# Patient Record
Sex: FEMALE | Race: WHITE | URBAN - METROPOLITAN AREA
[De-identification: names, ages, dates, MRNs, and addresses within clinical notes are randomized per-mention and may not be internally consistent; named-entity substitution may affect disease eponyms.]

---

## 2024-06-17 ENCOUNTER — OFFICE VISIT (OUTPATIENT)
Dept: OTOLARYNGOLOGY | Facility: CLINIC | Age: 44
End: 2024-06-17

## 2024-06-17 DIAGNOSIS — J32.0 CHRONIC MAXILLARY SINUSITIS: Primary | ICD-10-CM

## 2024-06-17 PROCEDURE — 31231 NASAL ENDOSCOPY DX: CPT | Performed by: OTOLARYNGOLOGY

## 2024-06-17 PROCEDURE — 99203 OFFICE O/P NEW LOW 30 MIN: CPT | Performed by: OTOLARYNGOLOGY

## 2024-06-17 RX ORDER — ALBUTEROL SULFATE 90 UG/1
2 AEROSOL, METERED RESPIRATORY (INHALATION) EVERY 6 HOURS PRN
COMMUNITY
Start: 2024-01-07

## 2024-06-17 RX ORDER — LEVOTHYROXINE SODIUM 0.07 MG/1
75 TABLET ORAL DAILY
COMMUNITY
Start: 2023-04-14

## 2024-06-17 NOTE — PROGRESS NOTES
NEW PATIENT PROGRESS NOTE  OTOLOGY/OTOLARYNGOLOGY    REF MD:  No referring provider defined for this encounter.    PCP: Yumiko Page MD    CHIEF COMPLAINT:    Chief Complaint   Patient presents with    Sinus Problem     Patient reports sinus pressure, reports sinus infections  x 2 in the past month.         HISTORY OF PRESENT ILLNESS: Kandace Juares is a 43 year old female who presents for evaluation of right-sided ear and sinus pressure with pain that radiates to jaw. Patient reports that 2 months she saw her dentist and had a normal exam. She saw PCP on 4/05/2024 for 1 week of sinus pain and right otalgia, diagnosed with acute maxillary sinusitis and started on a 10 days course of Augmentin. Patient has been on multiple rounds of antibiotics and steroids in the past for similar problem. The patient was diagnosed with a dust mite allergy after seeing an allergist in [place]. She uses nasal saline spray and a neti pot. The patient states that eleven days ago had her first migraine episode. The patient states that she had spinning vertigo when she was younger about fifteen years ago, however this has since resolved. Denies having had a previous ear tube placed or any prior otologic procedures.       PAST MEDICAL HISTORY:    Past Medical History:    Hyperthyroidism       PAST SURGICAL HISTORY:  History reviewed. No pertinent surgical history.    Current Outpatient Medications on File Prior to Visit   Medication Sig Dispense Refill    albuterol 108 (90 Base) MCG/ACT Inhalation Aero Soln Inhale 2 puffs into the lungs every 6 (six) hours as needed.      levothyroxine 75 MCG Oral Tab Take 1 tablet (75 mcg total) by mouth daily.       No current facility-administered medications on file prior to visit.       Allergies: Not on File    SOCIAL HISTORY:    Social History     Tobacco Use    Smoking status: Never    Smokeless tobacco: Never   Substance Use Topics    Alcohol use: Not Currently       FAMILY HISTORY:  Denies known family history of hearing loss, tinnitus, vertigo, or migraine.  Denies known family history of head and neck cancer, thyroid cancer, bleeding disorders.     REVIEW OF SYSTEMS:   Positives are in bold  Neuro: Headache, facial weakness, facial numbness, neck pain, vertigo  ENT: Hearing change, tinnitus, otorrhea, otalgia, aural fullness, ear pressure, vertigo, imbalance  Sinus pressure, rhinorrhea, congestion, facial pain, jaw pain, dysphagia, odynophagia, sore throat, voice changes, shortness of breath    EXAMINATION:  I washed my hands with an alcohol-based hand gel prior to examination  Constitutional:   --Vitals: There were no vitals taken for this visit.  General: no apparent distress, well-developed, conversant  Psych: affect pleasant and appropriate for age, alert and oriented  Respiratory: No stridor, stertor or increased work of breathing  ENT:  --Nose: no external nasal deformity, anterior rhinoscopy: Septum midline, no inferior turbinate hypertrophy, mucosa healthy, no rhinorrhea  --OC/OP: No trismus. No masses or lesions noted over the gingiva, buccal mucosa, tongue, FOM, hard/soft palate, tonsillar pillars, posterior pharyngeal wall. Tonsils are 1+ and soft. FOM/BOT are soft.   --Neck: No palpable cervical lymphadenopathy, no thyromegaly, no masses or lesions over the bilateral submandibular or parotid glands  --Ear: (bilateral ears were examined under binocular microscopy)  Right ear microscopic exam:  Pinna: Normal, no lesions or masses.  Mastoid: Nontender on palpation.   External auditory canal: Clear, no masses or lesions.  Tympanic membrane: Intact, no lesions, normal landmarks.  Middle ear: Aerated.    Left ear microscopic exam:  Pinna: Normal, no lesions or masses.  Mastoid: Nontender on palpation.   External auditory canal: Clear, no masses or lesions.  Tympanic membrane: Intact, no lesions, normal landmarks.  Middle ear: Aerated.    Nasal endoscopy (date 6/17/2024)  Verbal consent  obtained, patient was correctly identified  Topical anesthesia with aerosolized lidocaine and neosynepherine spray in the bilateral nares  Findings:   Right: No mucous throughout. Normal mucosa. Inferior turbinate is non-hypertrophic. Middle meatus is without polyps, purulence, masses. Middle turbinate is well formed and normal appearing. Sphenoethmoid recess is without polyps, purulence, masses.   Left: No mucous throughout. Normal mucosa. Inferior turbinate is non-hypertrophic. Middle meatus is without polyps, purulence, masses. Middle turbinate is well formed and normal appearing. Sphenoethmoid recess is without polyps, purulence, masses.   Septum: largely midline  Nasopharynx: No masses, bilateral eustachian tubes are patent. The bilateral fossae of Rosenmueller are clear.     ASSESSMENT/PLAN:  Kandace Juares is a 43 year old female with     ICD-10-CM   1. Chronic maxillary sinusitis  J32.0        IMPRESSION:  Chronic maxillary sinusitis    PLAN:  -CT Sinus w/o contrast recommended to further evaluate  -Lifestyle changes including stress reduction, migraine diet (caffeine cessation), sleep hygiene, hydration, regular meals discussed  -Patient education: Migraine: More than a Headache; this packet includes information regarding medication side effects  -Symptom diary  -Follow-up after imaging     Situation reviewed with the patient in detail.    Attention: This note has been scribed by Kely Serna under the supervision of Daljit Mcneal MD.     Daljit Mcneal MD  Otology/Otolaryngology  [address] [place] [address]  Phone [phone]  Fax [phone]      I have personally performed the services described in this documentation. All medical record entries made by the scribe were at my direction and in my presence. I have reviewed the chart and agree that the medical record reflects my personal performance and is accurate and  complete.

## 2024-06-28 ENCOUNTER — HOSPITAL ENCOUNTER (OUTPATIENT)
Dept: CT IMAGING | Facility: HOSPITAL | Age: 44
Discharge: HOME | End: 2024-06-28
Attending: OTOLARYNGOLOGY
Payer: COMMERCIAL

## 2024-06-28 DIAGNOSIS — J32.0 CHRONIC MAXILLARY SINUSITIS: ICD-10-CM

## 2024-06-28 PROCEDURE — 70486 CT MAXILLOFACIAL W/O DYE: CPT | Performed by: OTOLARYNGOLOGY

## 2024-07-02 NOTE — PROGRESS NOTES
PATIENT PROGRESS NOTE  OTOLOGY/OTOLARYNGOLOGY    REF MD:  No referring provider defined for this encounter.    PCP: Yumiko Page MD    CHIEF COMPLAINT:    Chief Complaint   Patient presents with    Results     Patient is here to discuss ct scan results.     LAST VISIT 6/17/2024  IMPRESSION:  Chronic maxillary sinusitis    PLAN:  -CT Sinus w/o contrast recommended to further evaluate  -Lifestyle changes including stress reduction, migraine diet (caffeine cessation), sleep hygiene, hydration, regular meals discussed  -Patient education: Migraine: More than a Headache; this packet includes information regarding medication side effects  -Symptom diary  -Follow-up after imaging   __________________________________________________________________________________  Interval history: Had CT since last visit. Feeling better overall. Notes she is severely allergic to dust and notes increased symptoms when exposed to dust.     HISTORY OF PRESENT ILLNESS: Kandace Juares is a 43 year old female who presents for evaluation of right-sided ear and sinus pressure with pain that radiates to jaw. Patient reports that 2 months she saw her dentist and had a normal exam. She saw PCP on 4/05/2024 for 1 week of sinus pain and right otalgia, diagnosed with acute maxillary sinusitis and started on a 10 days course of Augmentin. Patient has been on multiple rounds of antibiotics and steroids in the past for similar problem. The patient was diagnosed with a dust mite allergy after seeing an allergist in [place]. She uses nasal saline spray and a neti pot. The patient states that eleven days ago had her first migraine episode. The patient states that she had spinning vertigo when she was younger about fifteen years ago, however this has since resolved. Denies having had a previous ear tube placed or any prior otologic procedures.       PAST MEDICAL HISTORY:    Past Medical History:    Hyperthyroidism       PAST SURGICAL HISTORY:   History reviewed. No pertinent surgical history.    Current Outpatient Medications on File Prior to Visit   Medication Sig Dispense Refill    albuterol 108 (90 Base) MCG/ACT Inhalation Aero Soln Inhale 2 puffs into the lungs every 6 (six) hours as needed.      levothyroxine 75 MCG Oral Tab Take 1 tablet (75 mcg total) by mouth daily.       No current facility-administered medications on file prior to visit.       Allergies: Not on File    SOCIAL HISTORY:    Social History     Tobacco Use    Smoking status: Never    Smokeless tobacco: Never   Substance Use Topics    Alcohol use: Not Currently       FAMILY HISTORY: Denies known family history of hearing loss, tinnitus, vertigo, or migraine.  Denies known family history of head and neck cancer, thyroid cancer, bleeding disorders.     REVIEW OF SYSTEMS:   Positives are in bold  Neuro: Headache, facial weakness, facial numbness, neck pain, vertigo  ENT: Hearing change, tinnitus, otorrhea, otalgia, aural fullness, ear pressure, vertigo, imbalance  Sinus pressure, rhinorrhea, congestion, facial pain, jaw pain, dysphagia, odynophagia, sore throat, voice changes, shortness of breath    EXAMINATION:  I washed my hands with an alcohol-based hand gel prior to examination  Constitutional:   --Vitals: There were no vitals taken for this visit.  General: no apparent distress, well-developed, conversant  Psych: affect pleasant and appropriate for age, alert and oriented  Respiratory: No stridor, stertor or increased work of breathing  ENT:  --Nose: no external nasal deformity, anterior rhinoscopy: Septum midline, no inferior turbinate hypertrophy, mucosa healthy, no rhinorrhea  --OC/OP: No trismus. No masses or lesions noted over the gingiva, buccal mucosa, tongue, FOM, hard/soft palate, tonsillar pillars, posterior pharyngeal wall. Tonsils are 1+ and soft. FOM/BOT are soft.   --Neck: No palpable cervical lymphadenopathy, no thyromegaly, no masses or lesions over the bilateral  submandibular or parotid glands  --Ear: (bilateral ears were examined under binocular microscopy)  Right ear microscopic exam:  Pinna: Normal, no lesions or masses.  Mastoid: Nontender on palpation.   External auditory canal: Clear, no masses or lesions.  Tympanic membrane: Intact, no lesions, normal landmarks.  Middle ear: Aerated.    Left ear microscopic exam:  Pinna: Normal, no lesions or masses.  Mastoid: Nontender on palpation.   External auditory canal: Clear, no masses or lesions.  Tympanic membrane: Intact, no lesions, normal landmarks.  Middle ear: Aerated.    Nasal endoscopy (date 6/17/2024)  Verbal consent obtained, patient was correctly identified  Topical anesthesia with aerosolized lidocaine and neosynepherine spray in the bilateral nares  Findings:   Right: No mucous throughout. Normal mucosa. Inferior turbinate is non-hypertrophic. Middle meatus is without polyps, purulence, masses. Middle turbinate is well formed and normal appearing. Sphenoethmoid recess is without polyps, purulence, masses.   Left: No mucous throughout. Normal mucosa. Inferior turbinate is non-hypertrophic. Middle meatus is without polyps, purulence, masses. Middle turbinate is well formed and normal appearing. Sphenoethmoid recess is without polyps, purulence, masses.   Septum: largely midline  Nasopharynx: No masses, bilateral eustachian tubes are patent. The bilateral fossae of Rosenmueller are clear.     CT SINUS - 6/28/2024  Personally reviewed. Personal conclusions: Largely normal scan without sinus disease.  Impression  CONCLUSION:    Minimal mucoperiosteal thickening of the paranasal sinuses.   There is a questionable partially empty sella turcica.  Follow-up with MRI pituitary protocol with contrast for further evaluation may be done.    ASSESSMENT/PLAN:  Kandace Juares is a 43 year old female with     ICD-10-CM   1. Allergic rhinitis due to dust  J30.89   2. Empty sella turcica (HCC)  E23.6           IMPRESSION:  Allergic rhinitis, dust  Incidental finding of partially empty sella     PLAN:  -CT Sinus w/o contrast reviewed - no sinus disease  -Allergy referral   -Dicussed incidental finding of empty sella. Patient can consider neurology referral if desired; however discussed that this is a common incidental finding. Patient does not have frequent headaches, vision change, or pulsatile tinnitus.   -Lifestyle changes including stress reduction, migraine diet (caffeine cessation), sleep hygiene, hydration, regular meals discussed  -Follow-up as needed    Situation reviewed with the patient in detail.    Attention: This note has been scribed by Kely Serna under the supervision of Daljit Mcneal MD.     Daljit Mcneal MD  Otology/Otolaryngology  [address] [place] [address]  Phone [phone]  Fax [phone]      I have personally performed the services described in this documentation. All medical record entries made by the scribe were at my direction and in my presence. I have reviewed the chart and agree that the medical record reflects my personal performance and is accurate and complete.

## 2024-07-03 ENCOUNTER — OFFICE VISIT (OUTPATIENT)
Dept: OTOLARYNGOLOGY | Facility: CLINIC | Age: 44
End: 2024-07-03
Payer: COMMERCIAL

## 2024-07-03 DIAGNOSIS — E23.6 EMPTY SELLA TURCICA (HCC): ICD-10-CM

## 2024-07-03 DIAGNOSIS — J30.89 ALLERGIC RHINITIS DUE TO DUST: Primary | ICD-10-CM

## 2024-07-03 PROCEDURE — 99214 OFFICE O/P EST MOD 30 MIN: CPT | Performed by: OTOLARYNGOLOGY

## 2024-10-29 ENCOUNTER — TELEPHONE (OUTPATIENT)
Dept: ALLERGY | Facility: CLINIC | Age: 44
End: 2024-10-29

## 2024-10-29 NOTE — TELEPHONE ENCOUNTER
Patient called stating she has an appointment scheduled with  as a consult on 10/31/2024. She would like to verify if she needs to stop taking one of her antihistamines she is requesting.

## 2024-10-29 NOTE — TELEPHONE ENCOUNTER
Left message for patient that she will need to discontinue antihistamines five days prior to appointment. Asked patient to call back if she has any additional questions.     Antihistamines include: xyzal, zyrtec, benadryl, allegra, claritin and their generic forms. Azelastine nasal spray is also an antihistamine. PM formulations of medications typically contain benadryl.

## 2025-02-04 ENCOUNTER — NURSE ONLY (OUTPATIENT)
Dept: ALLERGY | Facility: CLINIC | Age: 45
End: 2025-02-04

## 2025-02-04 ENCOUNTER — OFFICE VISIT (OUTPATIENT)
Dept: ALLERGY | Facility: CLINIC | Age: 45
End: 2025-02-04
Payer: COMMERCIAL

## 2025-02-04 VITALS — BODY MASS INDEX: 49.47 KG/M2 | HEIGHT: 61 IN | WEIGHT: 262 LBS

## 2025-02-04 DIAGNOSIS — J30.2 SEASONAL AND PERENNIAL ALLERGIC RHINOCONJUNCTIVITIS: Primary | ICD-10-CM

## 2025-02-04 DIAGNOSIS — H10.10 SEASONAL AND PERENNIAL ALLERGIC RHINOCONJUNCTIVITIS: Primary | ICD-10-CM

## 2025-02-04 DIAGNOSIS — J32.0 CHRONIC MAXILLARY SINUSITIS: ICD-10-CM

## 2025-02-04 DIAGNOSIS — Z23 NEED FOR COVID-19 VACCINE: ICD-10-CM

## 2025-02-04 DIAGNOSIS — J30.89 ENVIRONMENTAL AND SEASONAL ALLERGIES: Primary | ICD-10-CM

## 2025-02-04 DIAGNOSIS — J30.89 SEASONAL AND PERENNIAL ALLERGIC RHINOCONJUNCTIVITIS: Primary | ICD-10-CM

## 2025-02-04 DIAGNOSIS — Z23 FLU VACCINE NEED: ICD-10-CM

## 2025-02-04 PROCEDURE — 95004 PERQ TESTS W/ALRGNC XTRCS: CPT | Performed by: ALLERGY & IMMUNOLOGY

## 2025-02-04 PROCEDURE — 95024 IQ TESTS W/ALLERGENIC XTRCS: CPT | Performed by: ALLERGY & IMMUNOLOGY

## 2025-02-04 PROCEDURE — 3008F BODY MASS INDEX DOCD: CPT | Performed by: ALLERGY & IMMUNOLOGY

## 2025-02-04 PROCEDURE — 99204 OFFICE O/P NEW MOD 45 MIN: CPT | Performed by: ALLERGY & IMMUNOLOGY

## 2025-02-04 RX ORDER — LEVOCETIRIZINE DIHYDROCHLORIDE 5 MG/1
5 TABLET, FILM COATED ORAL 2 TIMES DAILY
Qty: 180 TABLET | Refills: 1 | Status: SHIPPED | OUTPATIENT
Start: 2025-02-04

## 2025-02-04 RX ORDER — FLUTICASONE PROPIONATE 50 MCG
SPRAY, SUSPENSION (ML) NASAL
COMMUNITY
Start: 2023-04-15

## 2025-02-04 RX ORDER — AZELASTINE 1 MG/ML
2 SPRAY, METERED NASAL 2 TIMES DAILY
Qty: 3 EACH | Refills: 0 | Status: SHIPPED | OUTPATIENT
Start: 2025-02-04

## 2025-02-04 RX ORDER — ERGOCALCIFEROL 1.25 MG/1
CAPSULE, LIQUID FILLED ORAL
COMMUNITY
Start: 2025-01-31

## 2025-02-04 NOTE — PATIENT INSTRUCTIONS
#1 allergic rhinitis  5+ year history.  Typically worse from fall through spring better in the summer.  Currently using Xyzal 2.5 mg at night and Claritin in the morning as well as Flonase  Room for improvement  See above skin testing to screen for allergic triggers.  Reviewed avoidance measures and potential treatment option immunotherapy  Prior ENT evaluation in 2024.  No prior surgery.  No surgery recommended.  Recommend Xyzal 5 mg once a day up to twice a day if needed  Continue with Flonase 2 sprays per nostril once a day  Add Astelin nasal spray as an antihistamine nasal spray 2 sprays per nostril up to twice a day.  Consider Singulair if refractory.  Reviewed FDA warning    2.  Chronic sinusitis  Mild sinus mucosal thickening in the maxillary sinus on prior CT in summer 2024.  No prior surgery.  Sees ENT as needed  Continue with treatment of underlying allergies    3.  Flu vaccine recommended and offered deferred by patient today.      4.  COVID-vaccine booster recommended.  Please check with local pharmacy as we do not stock the vaccine in office.  Most recent booster is in 2024                 Orders This Visit:  No orders of the defined types were placed in this encounter.      Meds This Visit:  Requested Prescriptions     Signed Prescriptions Disp Refills    levocetirizine 5 MG Oral Tab 180 tablet 1     Sig: Take 1 tablet (5 mg total) by mouth in the morning and 1 tablet (5 mg total) before bedtime.    azelastine 0.1 % Nasal Solution 3 each 0     Si sprays by Nasal route 2 (two) times daily.

## 2025-02-04 NOTE — PROGRESS NOTES
Kandace Juares is a 44 year old female.    HPI:     Chief Complaint   Patient presents with    Allergies     New patient.  Patient concerned with history of seasonal/environmental allergies.  Patient offers that she has moved to our area within the last years.  She noticed nasal congestion and sinus pressure becoming worse, especially during the spring.       Patient is a 44-year-old female who presents for allergy consultation upon referral of her ENT Dr Valentine with a chief complaint of allergies and chronic sinus issues  I note from visit with ENT from July 3, 2024 notes concern for allergic triggers including seasonal allergies as well as chronic sinusitis  Prior CT of the sinus showed minimal sinus mucosal thickening.  Medications list include albuterol and Synthroid    Immunizations reviewed.  COVID-vaccine x 2 doses.  Last in   No flu vaccine on record    Today patient reports    Allergies and sinus issues  Duration: 5 Years  Worse over the past year since moving to the [facility]   Prior allergy eval 2 yrs prior : + dm per pt, no records   Worse fall thru spring, summer ok   Timing: Year-round with worsening in the spring  Symptoms: Nasal congestion sinus congestion runny nose sneezing  Severity: Moderate  Status: Worsening  Triggers: Allergies and?  Infections?  Tried: flonase, xyzal, claritin  Pets :  none   No prior sinus sx     Hx of asthma, ad, or food allergy:  denies         HISTORY:  Past Medical History:    Hyperthyroidism    Hyperthyroidism      Past Surgical History:   Procedure Laterality Date            Family History   Problem Relation Age of Onset    Heart Disorder Mother       Social History:   Social History     Socioeconomic History    Marital status:    Tobacco Use    Smoking status: Never     Passive exposure: Never    Smokeless tobacco: Never   Vaping Use    Vaping status: Never Used   Substance and Sexual Activity    Alcohol use: Not Currently    Drug use: Never      Social Drivers of Health      Received from [facility]    Housing Stability        Medications (Active prior to today's visit):  Current Outpatient Medications   Medication Sig Dispense Refill    ergocalciferol 1.25 MG (83047 UT) Oral Cap       fluticasone propionate 50 MCG/ACT Nasal Suspension       levocetirizine 5 MG Oral Tab Take 1 tablet (5 mg total) by mouth in the morning and 1 tablet (5 mg total) before bedtime. 180 tablet 1    azelastine 0.1 % Nasal Solution 2 sprays by Nasal route 2 (two) times daily. 3 each 0    levothyroxine 75 MCG Oral Tab Take 1 tablet (75 mcg total) by mouth daily.      albuterol 108 (90 Base) MCG/ACT Inhalation Aero Soln Inhale 2 puffs into the lungs every 6 (six) hours as needed.         Allergies:  Allergies[1]      ROS:     Allergic/Immuno:  See HPI  Cardiovascular:  Negative for irregular heartbeat/palpitations, chest pain, edema  Constitutional:  Negative night sweats,weight loss, irritability and lethargy  Endocrine:  Negative for cold intolerance, polydipsia and polyphagia  ENMT:  Negative for ear drainage, hearing loss  see hpi   Eyes:  Negative for eye discharge and vision loss  Gastrointestinal:  Negative for abdominal pain, diarrhea and vomiting  Genitourinary:  Negative for dysuria and hematuria  Hema/Lymph:  Negative for easy bleeding and easy bruising  Integumentary:  Negative for pruritus and rash  Musculoskeletal:  Negative for joint symptoms  Neurological:  Negative for dizziness, seizures  Psychiatric:  Negative for inappropriate interaction and psychiatric symptoms  Respiratory:  Negative for cough, dyspnea and wheezing      PHYSICAL EXAM:   Constitutional: responsive, no acute distress noted  Head/Face: NC/Atraumatic  Eyes/Vision: conjunctiva and lids are normal extraocular motion is intact   Ears/Audiometry: tympanic membranes are normal bilaterally hearing is grossly intact  Nose/Mouth/Throat: nose and throat are clear mucous membranes  are moist   Neck/Thyroid: neck is supple without adenopathy  Lymphatic: no abnormal cervical, supraclavicular or axillary adenopathy is noted  Respiratory: normal to inspection lungs are clear to auscultation bilaterally normal respiratory effort   Cardiovascular: regular rate and rhythm no murmurs, gallups, or rubs  Abdomen: soft non-tender non-distended  Skin/Hair: no unusual rashes present  Extremities: no edema, cyanosis, or clubbing  Neurological:Oriented to time, place, person & situation       ASSESSMENT/PLAN:   Assessment   Encounter Diagnoses   Name Primary?    Seasonal and perennial allergic rhinoconjunctivitis Yes    Chronic maxillary sinusitis     Flu vaccine need     Need for COVID-19 vaccine        Skin testing today to common indoor and outdoor environmental allergies was + to dust mite     Positive histamine control      #1 allergic rhinitis  5+ year history.  Typically worse from fall through spring better in the summer.  Currently using Xyzal 2.5 mg at night and Claritin in the morning as well as Flonase  Room for improvement  See above skin testing to screen for allergic triggers.  Reviewed avoidance measures and potential treatment option immunotherapy  Prior ENT evaluation in July 2024.  No prior surgery.  No surgery recommended.  Recommend Xyzal 5 mg once a day up to twice a day if needed  Continue with Flonase 2 sprays per nostril once a day  Add Astelin nasal spray as an antihistamine nasal spray 2 sprays per nostril up to twice a day.  Consider Singulair if refractory.  Reviewed FDA warning    2.  Chronic sinusitis  Mild sinus mucosal thickening in the maxillary sinus on prior CT in summer 2024.  No prior surgery.  Sees ENT as needed  Continue with treatment of underlying allergies    3.  Flu vaccine recommended and offered deferred by patient today.      4.  COVID-vaccine booster recommended.  Please check with local pharmacy as we do not stock the vaccine in office.  Most recent booster is  in 2024                 Orders This Visit:  No orders of the defined types were placed in this encounter.      Meds This Visit:  Requested Prescriptions     Signed Prescriptions Disp Refills    levocetirizine 5 MG Oral Tab 180 tablet 1     Sig: Take 1 tablet (5 mg total) by mouth in the morning and 1 tablet (5 mg total) before bedtime.    azelastine 0.1 % Nasal Solution 3 each 0     Si sprays by Nasal route 2 (two) times daily.       Imaging & Referrals:  None     2025  Rodrigue Mcclain MD      If medication samples were provided today, they were provided solely for patient education and training related to self administration of these medications.  Teaching, instruction and sample was provided to the patient by myself.  Teaching included  a review of potential adverse side effects as well as potential efficacy.  Patient's questions were answered in regards to medication administration and dosing and potential side effects. Teaching was provided via the teach back method         [1] Not on File